# Patient Record
Sex: MALE | Race: ASIAN | NOT HISPANIC OR LATINO | Employment: UNEMPLOYED | URBAN - METROPOLITAN AREA
[De-identification: names, ages, dates, MRNs, and addresses within clinical notes are randomized per-mention and may not be internally consistent; named-entity substitution may affect disease eponyms.]

---

## 2023-04-26 ENCOUNTER — OFFICE VISIT (OUTPATIENT)
Dept: GASTROENTEROLOGY | Facility: CLINIC | Age: 33
End: 2023-04-26

## 2023-04-26 VITALS
SYSTOLIC BLOOD PRESSURE: 155 MMHG | BODY MASS INDEX: 30.32 KG/M2 | HEIGHT: 70 IN | OXYGEN SATURATION: 96 % | HEART RATE: 75 BPM | WEIGHT: 211.81 LBS | DIASTOLIC BLOOD PRESSURE: 95 MMHG

## 2023-04-26 DIAGNOSIS — R10.13 EPIGASTRIC PAIN: Primary | ICD-10-CM

## 2023-04-26 DIAGNOSIS — K21.9 GASTROESOPHAGEAL REFLUX DISEASE, UNSPECIFIED WHETHER ESOPHAGITIS PRESENT: ICD-10-CM

## 2023-04-26 PROCEDURE — 99204 OFFICE O/P NEW MOD 45 MIN: CPT | Mod: S$GLB,,, | Performed by: INTERNAL MEDICINE

## 2023-04-26 PROCEDURE — 99204 PR OFFICE/OUTPT VISIT, NEW, LEVL IV, 45-59 MIN: ICD-10-PCS | Mod: S$GLB,,, | Performed by: INTERNAL MEDICINE

## 2023-04-26 RX ORDER — PANTOPRAZOLE SODIUM 40 MG/1
40 TABLET, DELAYED RELEASE ORAL DAILY
Qty: 90 TABLET | Refills: 1 | Status: SHIPPED | OUTPATIENT
Start: 2023-04-26 | End: 2023-07-26 | Stop reason: SDUPTHER

## 2023-04-26 RX ORDER — CETIRIZINE HYDROCHLORIDE 10 MG/1
10 TABLET, CHEWABLE ORAL
COMMUNITY
End: 2023-12-07

## 2023-04-26 RX ORDER — OMEPRAZOLE 10 MG/1
10 CAPSULE, DELAYED RELEASE ORAL DAILY
COMMUNITY
End: 2023-04-26

## 2023-04-26 RX ORDER — SUCRALFATE 1 G/1
1 TABLET ORAL
Qty: 40 TABLET | Refills: 0 | Status: SHIPPED | OUTPATIENT
Start: 2023-04-26 | End: 2023-05-06

## 2023-04-26 NOTE — PROGRESS NOTES
"[facility]  [address] [place] [address]  Phone: [phone]  Fax: [phone]    History & Physical         Provider: Dr. Melania Leon    Patient Name: [facility] SHAW (age):1990  32 y.o.           Gender: male   Phone: [phone]     Referring Physician: Symone Barraza     Vital Signs:   Height - 5' 10"  Weight - 211 lb  BMI -  30.39    Plan: EGD    Encounter Diagnoses   Name Primary?    Epigastric pain Yes    Gastroesophageal reflux disease, unspecified whether esophagitis present            History:      Past Medical History:   Diagnosis Date    Other seasonal allergic rhinitis       Past Surgical History:   Procedure Laterality Date    CYST REMOVAL      vocal cord    WISDOM TOOTH EXTRACTION        Medication List with Changes/Refills   Current Medications    CETIRIZINE 10 MG CHEWABLE TABLET    Take 10 mg by mouth.    PANTOPRAZOLE (PROTONIX) 40 MG TABLET    Take 1 tablet (40 mg total) by mouth once daily.    SUCRALFATE (CARAFATE) 1 GRAM TABLET    Take 1 tablet (1 g total) by mouth 4 (four) times daily before meals and nightly. for 10 days      Review of patient's allergies indicates:  No Known Allergies   No family history on file.   Social History     Tobacco Use    Smoking status: Never    Smokeless tobacco: Never   Substance Use Topics    Alcohol use: Not Currently    Drug use: Never        Physical Examination:     General Appearance:___________________________  HEENT: " _____________________________________  Abdomen:____________________________________  Heart:________________________________________  Lungs:_______________________________________  Extremities:___________________________________  Skin:_________________________________________  Endocrine:____________________________________  Genitourinary:_________________________________  Neurological:__________________________________      Patient has been evaluated immediately prior to sedation and is medically cleared for endoscopy with IVCS as an ASA class: ______      Physician Signature: _________________________       Date: ________  Time: ________

## 2023-04-26 NOTE — LETTER
April 26, 2023        Symone Barraza MD  [address] [facility] [place] [address]             [facility]  [address] [place] [address]  Phone: [phone]  Fax: [phone]   Patient: [place] Paris   MR Number: [id number]   YOB: 1990   Date of Visit: 4/26/2023       Dear Dr. Barraza:    Thank you for referring Nolan Paris to me for evaluation. Attached you will find relevant portions of my assessment and plan of care.    If you have questions, please do not hesitate to call me. I look forward to following Nolan Paris along with you.    Sincerely,      Melania Leon MD            CC  No Recipients    Enclosure

## 2023-04-26 NOTE — PROGRESS NOTES
Clinic Note    Reason for visit:  The primary encounter diagnosis was Epigastric pain. A diagnosis of Gastroesophageal reflux disease, unspecified whether esophagitis present was also pertinent to this visit.    PCP: Symone Barraza       HPI:  This is a 32 y.o. male who is here to establish care. Patient reports having epigastric pain that started about 3 months ago. Pain gets better when he eats. Tums helps, Peptobismol, and milk help. He has also been taking Prilosec OTC daily since this started and thinks it it helping. Pain comes on when he lays down. He also has been belching a lot. Denies blood in stool or black stool. Regular BMs. No diarrhea. Denies dysphagia. May take ibuprofen once a month for headache. He also states when this abdominal pain started, he actually had pain between his shoulder blades that happened before. He still has that pain intermittently as well. Usually comes right before epigastric pain. He states he went to urgent care when pain started and had normal UA and believes negative lipase. Also had negative H. Pylori breath test.        Review of Systems   Constitutional:  Negative for chills, diaphoresis, fatigue, fever and unexpected weight change.   HENT:  Negative for hearing loss, mouth sores, nosebleeds, postnasal drip, sore throat, trouble swallowing and voice change.    Eyes:  Negative for pain, discharge and eye dryness.   Respiratory:  Negative for apnea, cough, choking, chest tightness, shortness of breath and wheezing.    Cardiovascular:  Negative for chest pain, palpitations, leg swelling and claudication.   Gastrointestinal:  Positive for reflux. Negative for abdominal distention, abdominal pain, anal bleeding, blood in stool, change in bowel habit, constipation, diarrhea, nausea, rectal pain, vomiting, fecal incontinence and change in bowel habit.   Genitourinary:  Negative for bladder incontinence, difficulty urinating, dysuria, flank pain, frequency and hematuria.  "  Musculoskeletal:  Positive for back pain. Negative for arthralgias, joint swelling and joint deformity.   Integumentary:  Negative for color change, rash and wound.   Allergic/Immunologic: Negative for environmental allergies and food allergies.   Neurological:  Negative for seizures, facial asymmetry, speech difficulty, weakness, headaches and memory loss.   Hematological:  Negative for adenopathy. Does not bruise/bleed easily.   Psychiatric/Behavioral:  Negative for agitation, behavioral problems, confusion, hallucinations and sleep disturbance.       Past Medical History:   Diagnosis Date    Other seasonal allergic rhinitis      Past Surgical History:   Procedure Laterality Date    CYST REMOVAL      vocal cord    WISDOM TOOTH EXTRACTION       History reviewed. No pertinent family history.  Social History     Tobacco Use    Smoking status: Never    Smokeless tobacco: Never   Substance Use Topics    Alcohol use: Not Currently    Drug use: Never     Review of patient's allergies indicates:  No Known Allergies     Medication List with Changes/Refills   New Medications    PANTOPRAZOLE (PROTONIX) 40 MG TABLET    Take 1 tablet (40 mg total) by mouth once daily.    SUCRALFATE (CARAFATE) 1 GRAM TABLET    Take 1 tablet (1 g total) by mouth 4 (four) times daily before meals and nightly. for 10 days   Current Medications    CETIRIZINE 10 MG CHEWABLE TABLET    Take 10 mg by mouth.   Discontinued Medications    OMEPRAZOLE (PRILOSEC) 10 MG CAPSULE    Take 10 mg by mouth once daily.        Vital Signs:  BP (!) 155/95   Pulse 75   Ht 5' 10" (1.778 m)   Wt 96.1 kg (211 lb 12.8 oz)   SpO2 96%   BMI 30.39 kg/m²         Physical Exam  Constitutional:       General: He is not in acute distress.     Appearance: Normal appearance. He is well-developed. He is not ill-appearing or toxic-appearing.   HENT:      Head: Normocephalic and atraumatic.      Nose: Nose normal.      Mouth/Throat:      Mouth: Mucous membranes are moist.      " Pharynx: Oropharynx is clear. No oropharyngeal exudate or posterior oropharyngeal erythema.   Eyes:      General: Lids are normal. No scleral icterus.        Right eye: No discharge.         Left eye: No discharge.      Extraocular Movements: Extraocular movements intact.      Conjunctiva/sclera: Conjunctivae normal.   Cardiovascular:      Rate and Rhythm: Normal rate and regular rhythm.      Pulses:           Radial pulses are 2+ on the right side and 2+ on the left side.   Pulmonary:      Effort: Pulmonary effort is normal. No respiratory distress.      Breath sounds: No stridor. No wheezing or rhonchi.   Abdominal:      General: Bowel sounds are normal. There is no distension.      Palpations: Abdomen is soft. There is no fluid wave, hepatomegaly, splenomegaly or mass.      Tenderness: There is abdominal tenderness in the epigastric area. There is no guarding or rebound.      Comments: Mild    Musculoskeletal:      Cervical back: Full passive range of motion without pain.      Right lower leg: No edema.      Left lower leg: No edema.   Lymphadenopathy:      Cervical: No cervical adenopathy.   Skin:     General: Skin is warm and dry.      Capillary Refill: Capillary refill takes less than 2 seconds.      Coloration: Skin is not cyanotic, jaundiced or pale.      Findings: No rash.   Neurological:      General: No focal deficit present.      Mental Status: He is alert and oriented to person, place, and time.   Psychiatric:         Mood and Affect: Mood normal.         Behavior: Behavior is cooperative.          All of the data above and below has been reviewed by myself and any further interpretations will be reflected in the assessment and plan.   The data includes review of external notes, and independent interpretation of lab results, procedures, x-rays, and imaging reports.      Assessment:  Epigastric pain  -     sucralfate (CARAFATE) 1 gram tablet; Take 1 tablet (1 g total) by mouth 4 (four) times daily before  meals and nightly. for 10 days  Dispense: 40 tablet; Refill: 0  -     Ambulatory Referral to External Surgery    Gastroesophageal reflux disease, unspecified whether esophagitis present  -     pantoprazole (PROTONIX) 40 MG tablet; Take 1 tablet (40 mg total) by mouth once daily.  Dispense: 90 tablet; Refill: 1  -     Ambulatory Referral to External Surgery      Ulcer v GERD v H. Pylori v GB  Plan for gastric biopsies.   Offered abd US now. He would like to wait until after EGD. If EGD neg, then will proceed with ABD US and possibly HIDA scan if neg.     Recommendations:  Schedule upper endoscopy.   Begin taking pantoprazole 40 mg daily. Stop omeprazole.   May take Carafate with each meal and at bedtime. Stop taking if it does not help.     Risks, benefits, and alternatives of medical management, any associated procedures, and/or treatment discussed with the patient. Patient given opportunity to ask questions and voices understanding. Patient has elected to proceed with the recommended care modalities as discussed.    Follow up in about 6 months (around 10/26/2023).    Order summary:  Orders Placed This Encounter   Procedures    Ambulatory Referral to External Surgery        Instructed patient to notify my office if they have not been contacted within two weeks after any procedures, submitting any samples (biopsies, blood, stool, urine, etc.) or after any imaging (X-ray, CT, MRI, etc.).     Melania Leon MD    This document may have been created using a voice recognition transcribing system. Incorrect words or phrases may have been missed during proofreading. Please interpret accordingly or contact me for clarification.

## 2023-04-26 NOTE — PATIENT INSTRUCTIONS
Schedule upper endoscopy.   Begin taking pantoprazole 40 mg daily. Stop omeprazole.   May take Carafate with each meal and at bedtime. Stop taking if it does not help.

## 2023-05-09 ENCOUNTER — TELEPHONE (OUTPATIENT)
Dept: ADMINISTRATIVE | Facility: CLINIC | Age: 33
End: 2023-05-09

## 2023-05-10 ENCOUNTER — TELEPHONE (OUTPATIENT)
Dept: ADMINISTRATIVE | Facility: CLINIC | Age: 33
End: 2023-05-10

## 2023-05-11 ENCOUNTER — OUTSIDE PLACE OF SERVICE (OUTPATIENT)
Dept: GASTROENTEROLOGY | Facility: CLINIC | Age: 33
End: 2023-05-11

## 2023-05-11 PROCEDURE — 43239 EGD BIOPSY SINGLE/MULTIPLE: CPT | Mod: ,,, | Performed by: INTERNAL MEDICINE

## 2023-05-11 PROCEDURE — 43239 PR EGD, FLEX, W/BIOPSY, SGL/MULTI: ICD-10-PCS | Mod: ,,, | Performed by: INTERNAL MEDICINE

## 2023-05-18 ENCOUNTER — TELEPHONE (OUTPATIENT)
Dept: GASTROENTEROLOGY | Facility: CLINIC | Age: 33
End: 2023-05-18

## 2023-05-18 NOTE — TELEPHONE ENCOUNTER
EGBx reflux.    Notify patient that his esophagus Bx looked well.  He had some minor changes of reflux. He reported about 75% improvement with panto 40 daily. Okay to take sucralfate as needed.  After two months he may try stopping the panto. Notify me if any issues before his next OV. If Sx worsen/recur on panto, then may need to order abd US.  NBP

## 2023-07-18 ENCOUNTER — TELEPHONE (OUTPATIENT)
Dept: GASTROENTEROLOGY | Facility: CLINIC | Age: 33
End: 2023-07-18

## 2023-07-18 DIAGNOSIS — R19.7 DIARRHEA, UNSPECIFIED TYPE: Primary | ICD-10-CM

## 2023-07-18 NOTE — TELEPHONE ENCOUNTER
----- Message from Dov Moreno sent at 7/18/2023  2:18 PM CDT -----  Contact: Pt  Type:  Sooner Apoointment Request    Caller is requesting a sooner appointment.  Caller declined first available appointment listed below.  Caller will not accept being placed on the waitlist and is requesting a message be sent to doctor.  Name of Caller: pt  When is the first available appointment?   Symptoms: freq diarrhea x's 2 weeks  Would the patient rather a call back or a response via [facility]? Both portal / phone  Best Call Back Number: [phone]  Additional Information: pt wants to be seen before the week ends

## 2023-07-18 NOTE — TELEPHONE ENCOUNTER
Diarrhea for more than 2 weeks. Patient describes stools as yellow and green, fluffy and loose with occasional pain. Sometimes stool alternates between sausage shaped and watery diarrhea. After he eats he noticed what he describes as either a hunger pain and/or nausea. He wasn't sure if any of this would be related to GERD? -KG

## 2023-07-21 NOTE — TELEPHONE ENCOUNTER
These symptoms are not related to GERD. Since it has been going on for 2 weeks I can order stool test to rule out infection. Please let pt. Know that stool can not be formed when he does these tests. Orders signed.  KN

## 2023-07-24 ENCOUNTER — PATIENT MESSAGE (OUTPATIENT)
Dept: GASTROENTEROLOGY | Facility: CLINIC | Age: 33
End: 2023-07-24

## 2023-07-25 NOTE — PROGRESS NOTES
Clinic Note    Reason for visit:  The primary encounter diagnosis was Diarrhea, unspecified type. Diagnoses of Gastroesophageal reflux disease, unspecified whether esophagitis present and Irritable bowel syndrome with diarrhea were also pertinent to this visit.    PCP: Symone Barraza       HPI:  This is a 32 y.o. male who is established. Pt with with GERD- on pantoprazole 40mg daily. Here for diarrhea- stool tests were ordered.    Feels like knots running up and down his stomach and eating helps it. Infrequent. About twice a month. May be triggered by nerves. Taking panto 40 daily. If misses a day, then unclear if Sx are worse.  About 3-4 weeks ago went on a trip to [place]. Stools were loose x2 weeks.  He may have had about 4 nocturnal bowel movements in the middle of the night.  No blood in the stool.  Primarily loose.  This stools maybe looser as a progress.  May be small amounts.  Fluctuates somewhat.  No recent antibiotic use.  No sick contacts.  No fever.  He will get some lower mid abdominal discomfort either during the bowel movement maybe just before but that seems to improve after the bowel movement.  Seem to be fluctuating. Has not turned in the stool samples yet.  Has tried Tums, antidiarrheal, Pepto. The last two helped.    EGD 5/12/2023 EGBx reflux.    Review of Systems   Constitutional:  Negative for diaphoresis, fatigue, fever and unexpected weight change.   HENT:  Negative for hearing loss, mouth sores, postnasal drip, sore throat and trouble swallowing.    Eyes:  Negative for pain, discharge and eye dryness.   Respiratory:  Negative for apnea, cough, choking, chest tightness, shortness of breath and wheezing.    Cardiovascular:  Negative for chest pain, palpitations and leg swelling.   Gastrointestinal:  Positive for abdominal pain, change in bowel habit, constipation, diarrhea and change in bowel habit. Negative for abdominal distention, anal bleeding, blood in stool, nausea, rectal pain,  vomiting, reflux and fecal incontinence.   Genitourinary:  Negative for bladder incontinence, dysuria and hematuria.   Musculoskeletal:  Negative for arthralgias, back pain and joint swelling.   Integumentary:  Negative for color change and rash.   Allergic/Immunologic: Negative for environmental allergies and food allergies.   Neurological:  Negative for seizures and headaches.   Hematological:  Negative for adenopathy. Does not bruise/bleed easily.      Past Medical History:   Diagnosis Date    GERD (gastroesophageal reflux disease)     Other seasonal allergic rhinitis      Past Surgical History:   Procedure Laterality Date    CYST REMOVAL      vocal cord    ENDOSCOPY      WISDOM TOOTH EXTRACTION       Family History   Problem Relation Age of Onset    No Known Problems Mother     No Known Problems Father     No Known Problems Sister     No Known Problems Brother     No Known Problems Maternal Aunt     No Known Problems Maternal Uncle     No Known Problems Paternal Aunt     No Known Problems Paternal Uncle     No Known Problems Maternal Grandmother     No Known Problems Maternal Grandfather     No Known Problems Paternal Grandmother     No Known Problems Paternal Grandfather      Social History     Tobacco Use    Smoking status: Never    Smokeless tobacco: Never   Substance Use Topics    Alcohol use: Not Currently    Drug use: Never     Review of patient's allergies indicates:  No Known Allergies     Medication List with Changes/Refills   New Medications    DICYCLOMINE (BENTYL) 10 MG CAPSULE    Take 1 capsule (10 mg total) by mouth before meals and at bedtime as needed (loose stools or abdominal cramping).   Current Medications    CETIRIZINE 10 MG CHEWABLE TABLET    Take 10 mg by mouth.   Changed and/or Refilled Medications    Modified Medication Previous Medication    PANTOPRAZOLE (PROTONIX) 20 MG TABLET pantoprazole (PROTONIX) 40 MG tablet       Take 1 tablet (20 mg total) by mouth once daily.    Take 1 tablet  "(40 mg total) by mouth once daily.         Vital Signs:  BP (!) 142/91   Pulse 75   Ht 5' 10" (1.778 m)   Wt 96.6 kg (213 lb)   SpO2 97%   BMI 30.56 kg/m²         Physical Exam  Constitutional:       General: He is not in acute distress.     Appearance: Normal appearance. He is well-developed. He is not ill-appearing or toxic-appearing.   HENT:      Head: Normocephalic and atraumatic.      Nose: Nose normal.      Mouth/Throat:      Mouth: Mucous membranes are moist.      Pharynx: Oropharynx is clear. No oropharyngeal exudate or posterior oropharyngeal erythema.   Eyes:      General: Lids are normal. No scleral icterus.        Right eye: No discharge.         Left eye: No discharge.      Conjunctiva/sclera: Conjunctivae normal.   Cardiovascular:      Rate and Rhythm: Normal rate and regular rhythm.      Pulses:           Radial pulses are 2+ on the right side and 2+ on the left side.   Pulmonary:      Effort: Pulmonary effort is normal. No respiratory distress.      Breath sounds: No stridor. No wheezing.   Abdominal:      General: Bowel sounds are normal. There is no distension.      Palpations: Abdomen is soft. There is no fluid wave, hepatomegaly, splenomegaly or mass.      Tenderness: There is no abdominal tenderness. There is no guarding or rebound.   Musculoskeletal:      Cervical back: Full passive range of motion without pain.   Lymphadenopathy:      Cervical: No cervical adenopathy.   Skin:     General: Skin is warm and dry.      Capillary Refill: Capillary refill takes less than 2 seconds.      Coloration: Skin is not cyanotic, jaundiced or pale.   Neurological:      General: No focal deficit present.      Mental Status: He is alert and oriented to person, place, and time.   Psychiatric:         Mood and Affect: Mood normal.         Behavior: Behavior is cooperative.          All of the data above and below has been reviewed by myself and any further interpretations will be reflected in the assessment " and plan.   The data includes review of external notes, and independent interpretation of lab results, procedures, x-rays, and imaging reports.      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  Dispense: 40 capsule; Refill: 1    Gastroesophageal reflux disease, unspecified whether esophagitis present  Comments:  improved with panto 40 daily, trial of 20 daily  Orders:  -     pantoprazole (PROTONIX) 20 MG tablet; Take 1 tablet (20 mg total) by mouth once daily.  Dispense: 90 tablet; Refill: 2    Irritable bowel syndrome with diarrhea  -     dicyclomine (BENTYL) 10 MG capsule; Take 1 capsule (10 mg total) by mouth before meals and at bedtime as needed (loose stools or abdominal cramping).  Dispense: 40 capsule; Refill: 1    Overall his symptoms do not seem alarming.  It seems as if he may have had an infectious or potentially ingestion related event and may be a postinfectious IBS on top of that.  His upper GI symptoms overall have improved will work on tapering his PPI.  Plan as below.     Recommendations:  Submit stool samples.    Okay to take 1 chewable Pepto-Bismol up to 2 or 3 times a day.  May take dicyclomine as needed.  If you begin to see blood in your stool, have more frequent nighttime bowel movements that wake him up from sleep, increasing abdominal pain, weight loss, fever, or any other concerning symptoms then notify me.  You may continue the pantoprazole daily.  I have submitted the 20 mg lower dose tablets.    Please notify my office if you have not been contacted within two weeks after any procedures, submitting any samples (biopsies, blood, stool, urine, etc.) or after any imaging (X-ray, CT, MRI, etc.).     Please notify my office if you have not been contacted within  two weeks after any procedures, submitting any samples (biopsies, blood, stool, urine, etc.) or after any imaging (X-ray, CT, MRI, etc.).     Please notify my office if you have not been contacted within two weeks after any procedures, submitting any samples (biopsies, blood, stool, urine, etc.) or after any imaging (X-ray, CT, MRI, etc.).     Risks, benefits, and alternatives of medical management, any associated procedures, and/or treatment discussed with the patient. Patient given opportunity to ask questions and voices understanding. Patient has elected to proceed with the recommended care modalities as discussed.    Instructed patient to notify my office if they have not been contacted within two weeks after any procedures, submitting any samples (biopsies, blood, stool, urine, etc.) or after any imaging (X-ray, CT, MRI, etc.).     Follow up if symptoms worsen or fail to improve.  Has 12/2023 OV in place.    Order summary:  No orders of the defined types were placed in this encounter.         This document may have been created using a voice recognition transcribing system. Incorrect words or phrases may have been missed during proofreading. Please interpret accordingly or contact me for clarification.     Melania Leon MD

## 2023-07-26 ENCOUNTER — OFFICE VISIT (OUTPATIENT)
Dept: GASTROENTEROLOGY | Facility: CLINIC | Age: 33
End: 2023-07-26

## 2023-07-26 VITALS
WEIGHT: 213 LBS | SYSTOLIC BLOOD PRESSURE: 142 MMHG | BODY MASS INDEX: 30.49 KG/M2 | HEIGHT: 70 IN | HEART RATE: 75 BPM | OXYGEN SATURATION: 97 % | DIASTOLIC BLOOD PRESSURE: 91 MMHG

## 2023-07-26 DIAGNOSIS — K21.9 GASTROESOPHAGEAL REFLUX DISEASE, UNSPECIFIED WHETHER ESOPHAGITIS PRESENT: ICD-10-CM

## 2023-07-26 DIAGNOSIS — K58.0 IRRITABLE BOWEL SYNDROME WITH DIARRHEA: ICD-10-CM

## 2023-07-26 DIAGNOSIS — R19.7 DIARRHEA, UNSPECIFIED TYPE: Primary | ICD-10-CM

## 2023-07-26 LAB
APPEARANCE SNV: NORMAL
APPEARANCE SNV: NORMAL
C DIFF TOXIN: NEGATIVE
COLOR UR: NORMAL
CRYPTOSPORIDIUM DNA: NOT DETECTED
ENTAMOEBA HISTOLYTICA: NOT DETECTED
GIARDIA LAMBLIA DNA: NOT DETECTED
SOURCE: NORMAL

## 2023-07-26 PROCEDURE — 99214 OFFICE O/P EST MOD 30 MIN: CPT | Mod: S$GLB,,, | Performed by: INTERNAL MEDICINE

## 2023-07-26 PROCEDURE — 99214 PR OFFICE/OUTPT VISIT, EST, LEVL IV, 30-39 MIN: ICD-10-PCS | Mod: S$GLB,,, | Performed by: INTERNAL MEDICINE

## 2023-07-26 RX ORDER — DICYCLOMINE HYDROCHLORIDE 10 MG/1
10 CAPSULE ORAL
Qty: 40 CAPSULE | Refills: 1 | Status: SHIPPED | OUTPATIENT
Start: 2023-07-26

## 2023-07-26 RX ORDER — PANTOPRAZOLE SODIUM 20 MG/1
20 TABLET, DELAYED RELEASE ORAL DAILY
Qty: 90 TABLET | Refills: 2 | Status: SHIPPED | OUTPATIENT
Start: 2023-07-26

## 2023-07-26 NOTE — LETTER
July 26, 2023        Symone Barraza MD  [address] [facility] [place] [address]             [facility]  [address]  [place] [address]  Phone: [phone]  Fax: [phone]   Patient: [facility]   MR Number: [id number]   YOB: 1990   Date of Visit: 7/26/2023       Dear Dr. Barraza:    Thank you for referring Demetrius Paris to me for evaluation. Attached you will find relevant portions of my assessment and plan of care.    If you have questions, please do not hesitate to call me. I look forward to following Demetrius Paris along with you.    Sincerely,      Melania Leon MD            CC  No Recipients    Enclosure

## 2023-07-26 NOTE — PATIENT INSTRUCTIONS
Submit stool samples.    Okay to take 1 chewable Pepto-Bismol up to 2 or 3 times a day.  May take dicyclomine as needed.  If you begin to see blood in your stool, have more frequent nighttime bowel movements that wake him up from sleep, increasing abdominal pain, weight loss, fever, or any other concerning symptoms then notify me.  You may continue the pantoprazole daily.  I have submitted the 20 mg lower dose tablets.    Please notify my office if you have not been contacted within two weeks after any procedures, submitting any samples (biopsies, blood, stool, urine, etc.) or after any imaging (X-ray, CT, MRI, etc.).

## 2023-07-26 NOTE — PROGRESS NOTES
7/26/2023 C.diff/ Giar/Cryp- negative.     Please notify patient that his stool test came back and it was negative for infection.  KN

## 2023-07-27 ENCOUNTER — TELEPHONE (OUTPATIENT)
Dept: GASTROENTEROLOGY | Facility: CLINIC | Age: 33
End: 2023-07-27

## 2023-07-27 NOTE — TELEPHONE ENCOUNTER
----- Message from TIA Lowe sent at 7/26/2023  4:36 PM CDT -----  7/26/2023 C.diff/ Giar/Cryp- negative.     Please notify patient that his stool test came back and it was negative for infection.  KN

## 2023-12-07 ENCOUNTER — OFFICE VISIT (OUTPATIENT)
Dept: GASTROENTEROLOGY | Facility: CLINIC | Age: 33
End: 2023-12-07

## 2023-12-07 VITALS
OXYGEN SATURATION: 98 % | WEIGHT: 218.81 LBS | HEIGHT: 70 IN | HEART RATE: 94 BPM | SYSTOLIC BLOOD PRESSURE: 126 MMHG | BODY MASS INDEX: 31.32 KG/M2 | DIASTOLIC BLOOD PRESSURE: 88 MMHG

## 2023-12-07 DIAGNOSIS — K58.0 IRRITABLE BOWEL SYNDROME WITH DIARRHEA: Primary | ICD-10-CM

## 2023-12-07 DIAGNOSIS — R19.7 DIARRHEA, UNSPECIFIED TYPE: ICD-10-CM

## 2023-12-07 PROCEDURE — 99212 PR OFFICE/OUTPT VISIT, EST, LEVL II, 10-19 MIN: ICD-10-PCS | Mod: S$GLB,,, | Performed by: INTERNAL MEDICINE

## 2023-12-07 PROCEDURE — 99212 OFFICE O/P EST SF 10 MIN: CPT | Mod: S$GLB,,, | Performed by: INTERNAL MEDICINE

## 2023-12-07 NOTE — PROGRESS NOTES
Clinic Note    Reason for visit:  The primary encounter diagnosis was Irritable bowel syndrome with diarrhea. A diagnosis of Diarrhea, unspecified type was also pertinent to this visit.    PCP: Symone Barraza       HPI:  This is a 33 y.o. male who is here for a follow up. Patient with GERD. He was on pantoprazole 40 mg daily and this was reduced to 20 mg daily last OV. Patient has been out of pantoprazole for past month and has been feeling well. He states his diarrhea has improved and only having symptoms about 2 out of 7 days of the week. Each time bowel movements are very loose. Symptoms are much better than they were in September. No abdominal pain. Does not take dicyclomine.    7/26/2023 C.diff/ Giar/Cryp- negative.     EGD 5/11/2023: Small HH, EGBx reflux.     Review of Systems   Constitutional:  Negative for diaphoresis, fatigue, fever and unexpected weight change.   HENT:  Negative for hearing loss, mouth sores, postnasal drip, sore throat and trouble swallowing.    Eyes:  Negative for pain, discharge and eye dryness.   Respiratory:  Negative for apnea, cough, choking, chest tightness, shortness of breath and wheezing.    Cardiovascular:  Negative for chest pain, palpitations and leg swelling.   Gastrointestinal:  Negative for abdominal distention, abdominal pain, anal bleeding, blood in stool, change in bowel habit, constipation, diarrhea, nausea, rectal pain, vomiting, reflux and fecal incontinence.   Genitourinary:  Negative for bladder incontinence, dysuria and hematuria.   Musculoskeletal:  Negative for arthralgias, back pain and joint swelling.   Integumentary:  Negative for color change and rash.   Allergic/Immunologic: Negative for environmental allergies and food allergies.   Neurological:  Negative for seizures and headaches.   Hematological:  Negative for adenopathy. Does not bruise/bleed easily.        Past Medical History:   Diagnosis Date    GERD (gastroesophageal reflux disease)      "Other seasonal allergic rhinitis      Past Surgical History:   Procedure Laterality Date    CYST REMOVAL      vocal cord    ENDOSCOPY      WISDOM TOOTH EXTRACTION       Family History   Problem Relation Age of Onset    No Known Problems Mother     No Known Problems Father     No Known Problems Sister     No Known Problems Brother     No Known Problems Maternal Aunt     No Known Problems Maternal Uncle     No Known Problems Paternal Aunt     No Known Problems Paternal Uncle     No Known Problems Maternal Grandmother     No Known Problems Maternal Grandfather     No Known Problems Paternal Grandmother     No Known Problems Paternal Grandfather     Colon cancer Neg Hx     Crohn's disease Neg Hx     Esophageal cancer Neg Hx     Hemochromatosis Neg Hx     Liver cancer Neg Hx     Liver disease Neg Hx     Rectal cancer Neg Hx     Stomach cancer Neg Hx      Social History     Tobacco Use    Smoking status: Never    Smokeless tobacco: Never   Substance Use Topics    Alcohol use: Never    Drug use: Never     Review of patient's allergies indicates:  No Known Allergies     Medication List with Changes/Refills   Current Medications    DICYCLOMINE (BENTYL) 10 MG CAPSULE    Take 1 capsule (10 mg total) by mouth before meals and at bedtime as needed (loose stools or abdominal cramping).    PANTOPRAZOLE (PROTONIX) 20 MG TABLET    Take 1 tablet (20 mg total) by mouth once daily.   Discontinued Medications    CETIRIZINE 10 MG CHEWABLE TABLET    Take 10 mg by mouth.         Vital Signs:  /88 (BP Location: Left arm, Patient Position: Sitting)   Pulse 94   Ht 5' 10" (1.778 m)   Wt 99.2 kg (218 lb 12.8 oz)   SpO2 98%   BMI 31.39 kg/m²         Physical Exam  Constitutional:       General: He is not in acute distress.     Appearance: Normal appearance. He is well-developed. He is not ill-appearing or toxic-appearing.   HENT:      Head: Normocephalic and atraumatic.      Nose: Nose normal.      Mouth/Throat:      Mouth: Mucous " membranes are moist.      Pharynx: Oropharynx is clear. No oropharyngeal exudate or posterior oropharyngeal erythema.   Eyes:      General: Lids are normal. No scleral icterus.        Right eye: No discharge.         Left eye: No discharge.      Conjunctiva/sclera: Conjunctivae normal.   Cardiovascular:      Rate and Rhythm: Normal rate and regular rhythm.      Pulses:           Radial pulses are 2+ on the right side and 2+ on the left side.   Pulmonary:      Effort: Pulmonary effort is normal. No respiratory distress.      Breath sounds: No stridor. No wheezing.   Abdominal:      General: Bowel sounds are normal. There is no distension.      Palpations: Abdomen is soft. There is no fluid wave, hepatomegaly, splenomegaly or mass.      Tenderness: There is no abdominal tenderness. There is no guarding or rebound.   Musculoskeletal:      Cervical back: Full passive range of motion without pain.   Lymphadenopathy:      Cervical: No cervical adenopathy.   Skin:     General: Skin is warm and dry.      Capillary Refill: Capillary refill takes less than 2 seconds.      Coloration: Skin is not cyanotic, jaundiced or pale.   Neurological:      General: No focal deficit present.      Mental Status: He is alert and oriented to person, place, and time.   Psychiatric:         Mood and Affect: Mood normal.         Behavior: Behavior is cooperative.            All of the data above and below has been reviewed by myself and any further interpretations will be reflected in the assessment and plan.   The data includes review of external notes, and independent interpretation of lab results, procedures, x-rays, and imaging reports.      Assessment:  Irritable bowel syndrome with diarrhea    Diarrhea, unspecified type         IBS-D. Patient diarrhea improving. May consider Xifaxan.    Recommendations:    Okay to take Pantoprazole as needed.      Risks, benefits, and alternatives of medical management, any associated procedures, and/or  treatment discussed with the patient. Patient given opportunity to ask questions and voices understanding. Patient has elected to proceed with the recommended care modalities as discussed.    Instructed patient to notify my office if they have not been contacted within two weeks after any procedures, submitting any samples (biopsies, blood, stool, urine, etc.) or after any imaging (X-ray, CT, MRI, etc.).     Follow up in about 1 year (around 12/7/2024).    Order summary:  No orders of the defined types were placed in this encounter.     This assessment, plan, and documentation was performed in collaboration with Judy Meza NP.     This document may have been created using a voice recognition transcribing system. Incorrect words or phrases may have been missed during proofreading. Please interpret accordingly or contact me for clarification.     Melania Leon MD

## 2023-12-07 NOTE — LETTER
December 7, 2023        Symone Barraza MD  [address] [place] [address]             [facility]  [address] [address]  Phone: [phone]  Fax: [phone]   Patient: Nolan Paris   MR Number: [id number]   YOB: 1990   Date of Visit: 12/7/2023       Dear Dr. Barraza:    Thank you for referring Nolan Paris to me for evaluation. Attached you will find relevant portions of my assessment and plan of care.    If you have questions, please do not hesitate to call me. I look forward to following Nolan Paris along with you.    Sincerely,      Melania Leon MD            CC  No Recipients    Enclosure

## 2024-01-17 DIAGNOSIS — K21.9 GASTROESOPHAGEAL REFLUX DISEASE, UNSPECIFIED WHETHER ESOPHAGITIS PRESENT: ICD-10-CM

## 2024-01-19 RX ORDER — SUCRALFATE 1 G/1
1 TABLET ORAL 4 TIMES DAILY
Qty: 40 TABLET | Refills: 0 | OUTPATIENT
Start: 2024-01-19

## 2024-07-02 DIAGNOSIS — K21.9 GASTROESOPHAGEAL REFLUX DISEASE, UNSPECIFIED WHETHER ESOPHAGITIS PRESENT: ICD-10-CM

## 2024-07-05 RX ORDER — PANTOPRAZOLE SODIUM 20 MG/1
20 TABLET, DELAYED RELEASE ORAL
Qty: 90 TABLET | Refills: 2 | Status: SHIPPED | OUTPATIENT
Start: 2024-07-05

## 2024-09-04 ENCOUNTER — TELEPHONE (OUTPATIENT)
Dept: GASTROENTEROLOGY | Facility: CLINIC | Age: 34
End: 2024-09-04

## 2024-09-04 DIAGNOSIS — K21.9 GASTROESOPHAGEAL REFLUX DISEASE, UNSPECIFIED WHETHER ESOPHAGITIS PRESENT: ICD-10-CM

## 2024-09-04 NOTE — TELEPHONE ENCOUNTER
----- Message from Rosa Mahan sent at 9/4/2024  2:59 PM CDT -----  Contact: self  Patient is requesting a call back regarding GERD flaring up - asking can he up his dosage of medication. Please call back at [phone]

## 2024-09-04 NOTE — TELEPHONE ENCOUNTER
Patient states recently past 2 months, has had more flare up with GERD. Patient currently taking 20mg pantoprazole. Patient states in past he was on 40mg, but dosage was decreased. Patient wanting to know if he can increase back up to 40mg at this time in order to control GERD flare up he is currently having. Patient wanting to know if there are any side effects or risks with increasing from 20mg to 40mg. Please advise. Patient uses [facility] pharmacy inside [employer]. Patient states to leave a message or can even send portal message for feedback if he is unable to be reached. BF

## 2024-09-05 NOTE — TELEPHONE ENCOUNTER
Staff spoke to pt.. he advised he's been having a flare up and is wanting to know if he can increase the  pantoprazole to 40mg until the reflux is under control?  viry

## 2024-09-05 NOTE — TELEPHONE ENCOUNTER
----- Message from Selene Ojeda sent at 9/5/2024  4:38 PM CDT -----  Regarding: Prescription  Contact: patient  Per phone call with patient, he stated that he had started to take this medication for GERD PANTOPRAZOLE 20 MG and he wanted to know if he can take two of the pills for 40mg.  Please return call at [phone] (home).    Thanks,  SJ

## 2024-09-06 ENCOUNTER — TELEPHONE (OUTPATIENT)
Dept: GASTROENTEROLOGY | Facility: CLINIC | Age: 34
End: 2024-09-06

## 2024-09-06 RX ORDER — PANTOPRAZOLE SODIUM 20 MG/1
20 TABLET, DELAYED RELEASE ORAL
Qty: 180 TABLET | Refills: 1 | Status: SHIPPED | OUTPATIENT
Start: 2024-09-06 | End: 2025-03-05

## 2024-09-06 NOTE — TELEPHONE ENCOUNTER
Called patient back 2x. No answer. Lvm telling him to call the office back or respond back to us on the portal. DMP

## 2024-09-06 NOTE — TELEPHONE ENCOUNTER
brooklynn from Mikaela Dejesus sent at 9/6/2024 3:41 PM CDT -----   Type:  Patient Returning Call     Who Called:Nolan  Who Left Message for Patient:unknown  Does the patient know what this is regarding?:unknown  Would the patient rather a call back or a response via [facility]? call  Best Call Back Number:[phone]  Additional Information: Patient reports missing a call and request to receive another call back. Please give patient a call back to assist.   Thank you,           Staff spoke to pt... he will take it BID and if it doesn't work he will let us know...  viry

## 2024-09-06 NOTE — TELEPHONE ENCOUNTER
Can increase to pantoprazole 20mg BID. Will send out a new prescription. He should take it twice daily for 8 weeks than can try decreasing back down to just 20mg daily. He should keep his OV as scheduled.  Aleksandar

## 2024-09-06 NOTE — TELEPHONE ENCOUNTER
----- Message from Mikaela Dejesus sent at 9/6/2024  3:41 PM CDT -----  Contact: Nolan  Type:  Patient Returning Call    Who Called:Nolan  Who Left Message for Patient:unknown  Does the patient know what this is regarding?:unknown  Would the patient rather a call back or a response via [facility]? call  Best Call Back Number:[phone]  Additional Information: Patient reports missing a call and request to receive another call back. Please give patient a call back to assist.   Thank you,  GH

## 2024-09-06 NOTE — TELEPHONE ENCOUNTER
Message  Received: Today   Pt Advice  Rosa Mahan Staff  Caller: self (Today,  3:07 PM)  Patient is requesting a call back regarding asking can he increase his medication - no one never got back with him on it. Please call back at [phone]

## 2025-03-16 ENCOUNTER — PATIENT MESSAGE (OUTPATIENT)
Dept: GASTROENTEROLOGY | Facility: CLINIC | Age: 35
End: 2025-03-16

## 2025-03-17 NOTE — TELEPHONE ENCOUNTER
Love Mckeon Staff  Caller: DEMETRIUS TOURE [[id number]] (Today,  8:50 AM)  ..TYPE: Patient Requesting Refill    Who Called:  DEMETRIUS TOURE [[id number]]  Refill or New Rx: refill/renew  RX Name and Strength: carafate, not on file. For Gurd.  How is the patient currently taking it? (ex. 1XDay):  Is this a 30 day or 90 day RX: as prescribed  Preferred Pharmacy with phone number:  [id number] - [place], [place] - [address]  [address] [address]  Phone: [phone] Fax: [phone]  Local or Mail Order:local  Ordering Provider: Edward  Would the patient rather a call back or a response via [facility]?  call  Best Call Back Number: [phone] (home)  Additional Information:

## 2025-03-18 ENCOUNTER — TELEPHONE (OUTPATIENT)
Dept: GASTROENTEROLOGY | Facility: CLINIC | Age: 35
End: 2025-03-18

## 2025-03-18 ENCOUNTER — OFFICE VISIT (OUTPATIENT)
Dept: GASTROENTEROLOGY | Facility: CLINIC | Age: 35
End: 2025-03-18

## 2025-03-18 VITALS
SYSTOLIC BLOOD PRESSURE: 133 MMHG | HEART RATE: 88 BPM | HEIGHT: 70 IN | DIASTOLIC BLOOD PRESSURE: 80 MMHG | WEIGHT: 194 LBS | BODY MASS INDEX: 27.77 KG/M2 | OXYGEN SATURATION: 93 %

## 2025-03-18 DIAGNOSIS — K21.9 GASTROESOPHAGEAL REFLUX DISEASE, UNSPECIFIED WHETHER ESOPHAGITIS PRESENT: Primary | ICD-10-CM

## 2025-03-18 DIAGNOSIS — R11.0 NAUSEA: ICD-10-CM

## 2025-03-18 DIAGNOSIS — R10.13 EPIGASTRIC PAIN: ICD-10-CM

## 2025-03-18 PROCEDURE — 99213 OFFICE O/P EST LOW 20 MIN: CPT | Mod: S$PBB,,, | Performed by: INTERNAL MEDICINE

## 2025-03-18 RX ORDER — SUCRALFATE 1 G/1
1 TABLET ORAL 4 TIMES DAILY
Qty: 40 TABLET | Refills: 0 | Status: SHIPPED | OUTPATIENT
Start: 2025-03-18 | End: 2025-03-28

## 2025-03-18 NOTE — PATIENT INSTRUCTIONS
Schedule abdominal ultrasound.    If any tests, procedures, or imaging has been ordered and you are not contacted to schedule within 1-2 weeks, then you may call the central scheduling department directly at [phone].   Please notify my office if you have not been contacted within two weeks after any procedures, submitting any samples (biopsies, blood, stool, urine, etc.) or after any imaging (X-ray, CT, MRI, etc.).

## 2025-03-18 NOTE — TELEPHONE ENCOUNTER
Sent pt a message informing him he would need to come in for a o/v for a refill bc he hasn't been seen since 2023. Informed him April 8th is the earliest he could be seen. Awaiting reply to see if that date works for him along with times (8:40,9:00, & 9:40) -ANGIE

## 2025-03-18 NOTE — PROGRESS NOTES
Clinic Note    Reason for visit:  The primary encounter diagnosis was Gastroesophageal reflux disease, unspecified whether esophagitis present. Diagnoses of Epigastric pain and Nausea were also pertinent to this visit.    PCP: Symone Barraza       HPI:  This is a 34 y.o. male who was last seen in 2023. Patient with GERD and IBS. He has had worsening reflux lately. He states he has episodes of severe abdominal pain that occurs intermittently.  Describes as tight squeezing pain to his abdomen. Pain is 9 (0-10). The severe pain will usually last about 3 hours. He states TUMS, Pepcid, and Carafate all help this pain. He has associated nausea with this pain. This pain usually occurs at night time. He does think that spicy or caffeine may be exacerbating these symptoms. He is having mild abdominal discomfort after each meal currently. He takes pantoprazole 20mg daily. Will take BID occasionally. Denies abdominal pain. Reports having BM daily. Is having loose stools intermittently. Can occur multiple times a week. Has been eating mainly vegetables incase his symptoms are related to reflux.. Reports his stool his always yellow. Feels dietary indiscretions are they primary  of his symptoms.    ABD US ordered was not completed.    7/26/2023 C.diff/ Giar/Cryp- negative.      EGD 5/11/2023: Small HH, EGBx reflux.     Review of Systems   Constitutional:  Negative for diaphoresis, fatigue, fever and unexpected weight change.   HENT:  Negative for hearing loss, mouth sores, postnasal drip, sore throat and trouble swallowing.    Eyes:  Negative for pain, discharge and eye dryness.   Respiratory:  Negative for apnea, cough, choking, chest tightness, shortness of breath and wheezing.    Cardiovascular:  Negative for chest pain, palpitations and leg swelling.   Gastrointestinal:  Positive for abdominal pain. Negative for abdominal distention, anal bleeding, blood in stool, change in bowel habit, constipation, diarrhea,  nausea, rectal pain, vomiting, reflux and fecal incontinence.   Genitourinary:  Negative for bladder incontinence, dysuria and hematuria.   Musculoskeletal:  Negative for arthralgias, back pain and joint swelling.   Integumentary:  Negative for color change and rash.   Allergic/Immunologic: Negative for environmental allergies and food allergies.   Neurological:  Negative for seizures and headaches.   Hematological:  Negative for adenopathy. Does not bruise/bleed easily.        Past Medical History:   Diagnosis Date    GERD (gastroesophageal reflux disease)     Other seasonal allergic rhinitis      Past Surgical History:   Procedure Laterality Date    CYST REMOVAL      vocal cord    ENDOSCOPY      WISDOM TOOTH EXTRACTION       Family History   Problem Relation Name Age of Onset    No Known Problems Mother      No Known Problems Father      No Known Problems Sister      No Known Problems Brother      No Known Problems Maternal Aunt      No Known Problems Maternal Uncle      No Known Problems Paternal Aunt      No Known Problems Paternal Uncle      No Known Problems Maternal Grandmother      No Known Problems Maternal Grandfather      No Known Problems Paternal Grandmother      No Known Problems Paternal Grandfather      Colon cancer Neg Hx      Crohn's disease Neg Hx      Esophageal cancer Neg Hx      Hemochromatosis Neg Hx      Liver cancer Neg Hx      Liver disease Neg Hx      Rectal cancer Neg Hx      Stomach cancer Neg Hx       Social History[1]  Review of patient's allergies indicates:  No Known Allergies     Medication List with Changes/Refills   New Medications    SUCRALFATE (CARAFATE) 1 GRAM TABLET    Take 1 tablet (1 g total) by mouth 4 (four) times daily. for 10 days   Current Medications    PANTOPRAZOLE (PROTONIX) 20 MG TABLET    Take 1 tablet (20 mg total) by mouth 2 (two) times daily before meals.   Discontinued Medications    DICYCLOMINE (BENTYL) 10 MG CAPSULE    Take 1 capsule (10 mg total) by mouth  "before meals and at bedtime as needed (loose stools or abdominal cramping).         Vital Signs:  /80 (BP Location: Left arm, Patient Position: Sitting)   Pulse 88   Ht 5' 10" (1.778 m)   Wt 88 kg (194 lb)   SpO2 (!) 93%   BMI 27.84 kg/m²         Physical Exam  Constitutional:       General: He is not in acute distress.     Appearance: Normal appearance. He is well-developed. He is not ill-appearing or toxic-appearing.   HENT:      Head: Normocephalic and atraumatic.      Nose: Nose normal.      Mouth/Throat:      Mouth: Mucous membranes are moist.      Pharynx: Oropharynx is clear. No oropharyngeal exudate or posterior oropharyngeal erythema.   Eyes:      General: Lids are normal. No scleral icterus.        Right eye: No discharge.         Left eye: No discharge.      Conjunctiva/sclera: Conjunctivae normal.   Cardiovascular:      Rate and Rhythm: Normal rate and regular rhythm.      Pulses:           Radial pulses are 2+ on the right side and 2+ on the left side.   Pulmonary:      Effort: Pulmonary effort is normal. No respiratory distress.      Breath sounds: No stridor. No wheezing.   Abdominal:      General: Bowel sounds are normal. There is no distension.      Palpations: Abdomen is soft. There is no fluid wave, hepatomegaly, splenomegaly or mass.      Tenderness: There is no abdominal tenderness. There is no guarding or rebound.   Lymphadenopathy:      Cervical: No cervical adenopathy.   Skin:     General: Skin is warm and dry.      Capillary Refill: Capillary refill takes less than 2 seconds.      Coloration: Skin is not cyanotic, jaundiced or pale.   Neurological:      General: No focal deficit present.      Mental Status: He is alert and oriented to person, place, and time.   Psychiatric:         Mood and Affect: Mood normal.         Behavior: Behavior is cooperative.            All of the data above and below has been reviewed by myself and any further interpretations will be reflected in the " assessment and plan.   The data includes review of external notes, and independent interpretation of lab results, procedures, x-rays, and imaging reports.      Assessment:  Gastroesophageal reflux disease, unspecified whether esophagitis present  -     sucralfate (CARAFATE) 1 gram tablet; Take 1 tablet (1 g total) by mouth 4 (four) times daily. for 10 days  Dispense: 40 tablet; Refill: 0    Epigastric pain  -     US Abdomen Limited; Future; Expected date: 03/18/2025    Nausea  -     US Abdomen Limited; Future; Expected date: 03/18/2025         GERD- on pantoprazole 20mg. Abdominal pain improves with carafate. Will send out a course of carfate. Does not feel primary source is anxiety given the direct relation to diet.  Plan for US to r/o GB etiology.    ECG done when he had tooth procedure. Told it was normal. He was around 25-27 years of age.    Discuss the potential of low-dose amitriptyline for IBS.  We will get the abdominal ultrasound first.  We may need to request his electrocardiogram report and image from his prior ENT Dr. Acosta from 2018.    Recommendations:    Schedule abdominal ultrasound.  Okay to take carafate.    If any tests, procedures, or imaging has been ordered and you are not contacted to schedule within 1-2 weeks, then you may call the central scheduling department directly at [phone].     Risks, benefits, and alternatives of medical management, any associated procedures, and/or treatment discussed with the patient. Patient given opportunity to ask questions and voices understanding. Patient has elected to proceed with the recommended care modalities as discussed.    Instructed patient to notify my office if they have not been contacted within two weeks after any procedures, submitting any samples (biopsies, blood, stool, urine, etc.) or after any imaging (X-ray, CT, MRI, etc.).     Follow up in about 1 year (around 3/18/2026).    Order summary:  Orders Placed This Encounter    Procedures    US Abdomen Limited      This assessment, plan, and documentation was performed in collaboration with Judy Meza NP.     This document may have been created using a voice recognition transcribing system. Incorrect words or phrases may have been missed during proofreading. Please interpret accordingly or contact me for clarification.     Melania Leon MD         [1]   Social History  Tobacco Use    Smoking status: Never    Smokeless tobacco: Never   Substance Use Topics    Alcohol use: Never    Drug use: Never

## 2025-03-31 DIAGNOSIS — R10.13 EPIGASTRIC PAIN: Primary | ICD-10-CM

## 2025-03-31 RX ORDER — SUCRALFATE 1 G/1
1 TABLET ORAL 4 TIMES DAILY
Qty: 40 TABLET | Refills: 0 | Status: CANCELLED | OUTPATIENT
Start: 2025-03-31 | End: 2025-04-10

## 2025-03-31 NOTE — TELEPHONE ENCOUNTER
Patient wanted to check where the carafate rx request was and also get the US results, states he saw in his [facility] portal that the radiologist submitted impressions, I do not have notations from our providers on results so I informed him that as soon as the providers reviewed the impression the results would be notated and we'd call him to let him know the final results. Patient has no further questions at this time.

## 2025-03-31 NOTE — TELEPHONE ENCOUNTER
----- Message from Jil sent at 3/31/2025  3:50 PM CDT -----  Contact: pt  Pt calling for results of his ultrasound test and can be reached at [phone]  Pt also wants refill for carafart medication.[id number] [place], [place] - [address] [address]Madbi: [phone] Fax: [phone],

## 2025-03-31 NOTE — TELEPHONE ENCOUNTER
----- Message from Ibeth sent at 3/31/2025  9:09 AM CDT -----  Contact: self  Type:  Needs Medical AdviceWho Called: [place] Earl name and phone #:  [id number] [place], [place] [facility] [id number] [place] [address]Mzmrm: [phone] Fax: [phone] the patient rather a call back or a response via [facility]? Call [facility] Call Back Number: [phone] Information: pt stating he was prescribed a 10 day supply of carafate. States he was informed to call to get a refill if he was still having symptoms and he is stating he is.

## 2025-04-02 ENCOUNTER — TELEPHONE (OUTPATIENT)
Dept: GASTROENTEROLOGY | Facility: CLINIC | Age: 35
End: 2025-04-02

## 2025-04-02 NOTE — TELEPHONE ENCOUNTER
Patient calling to check on US results I informed patient dr georges has not yet seen them as of yet.- ABO

## 2025-04-02 NOTE — TELEPHONE ENCOUNTER
----- Message from Annelise sent at 4/2/2025  2:14 PM CDT -----  Patient is calling in regards to results and medication refill that's not listed..please call him back at [phone]

## 2025-04-03 RX ORDER — SUCRALFATE 1 G/1
1 TABLET ORAL 4 TIMES DAILY
Qty: 40 TABLET | Refills: 0 | Status: SHIPPED | OUTPATIENT
Start: 2025-04-03 | End: 2025-04-13

## 2025-04-03 NOTE — TELEPHONE ENCOUNTER
Notify patient his US showed gallstones and overgrowth of the gallbladder wall. It's possible that his upper GI symptoms are due to the gallbladder findings and typically we would recommend referral to a general surgeon to discuss removing the gallbladder. I know he does not have insurance but we can still send the referral to a general surgeon if he would like. If his pain resolves with Carafate, then he can continue taking it for now. He should let us know if he wants referral to gen surg and if he has a preference on a surgeon.    RUQ US 3/31/2025: fatty liver, cholelithiasis, GB adenomyomatosis.   MLC

## 2025-04-10 ENCOUNTER — TELEPHONE (OUTPATIENT)
Dept: GASTROENTEROLOGY | Facility: CLINIC | Age: 35
End: 2025-04-10

## 2025-04-10 NOTE — TELEPHONE ENCOUNTER
----- Message from Annelise sent at 4/10/2025  3:56 PM CDT -----  Patient is calling to setup consultation please call him back at [phone]

## 2025-04-10 NOTE — TELEPHONE ENCOUNTER
PATIENT called saying he does not know what his next steps are for the gallstones and I informed him we sent a referral to dr cuevas and he said they called him and was trying to schedule his surgery right then and there and he said he would like to have a consult first and wanted to know what you recommend him to do. -ABO

## 2025-04-14 DIAGNOSIS — R10.13 EPIGASTRIC PAIN: ICD-10-CM

## 2025-04-14 RX ORDER — SUCRALFATE 1 G/1
1 TABLET ORAL 4 TIMES DAILY
Qty: 120 TABLET | Refills: 0 | Status: SHIPPED | OUTPATIENT
Start: 2025-04-14 | End: 2025-05-14

## 2025-04-14 NOTE — TELEPHONE ENCOUNTER
Please see the attached refill request. Patient sent follow up message asking for more than a 10 day script.

## 2025-05-08 DIAGNOSIS — R10.13 EPIGASTRIC PAIN: ICD-10-CM

## 2025-05-08 RX ORDER — SUCRALFATE 1 G/1
1 TABLET ORAL 4 TIMES DAILY
Qty: 360 TABLET | Refills: 1 | Status: SHIPPED | OUTPATIENT
Start: 2025-05-08

## 2025-05-27 ENCOUNTER — TELEPHONE (OUTPATIENT)
Dept: GASTROENTEROLOGY | Facility: CLINIC | Age: 35
End: 2025-05-27

## 2025-05-27 NOTE — TELEPHONE ENCOUNTER
Spoke to patient, he was unable to  the refill sent on 5/8 and thought that it would need a new rx sent, let patient know that the rx is still active and he just needs to call [facility] to let them know he's ready to  and have them refill the rx if it's been re-shelved. Asked patient to call us back if he has any trouble.

## 2025-05-27 NOTE — TELEPHONE ENCOUNTER
Copied from CRM #[id number]. Topic: General Inquiry - Return Call  >> May 27, 2025 10:22 AM Zuhair wrote:  Type:  Patient Requesting Call    Who Called:Nolan Paris  Does the patient know what this is regarding?:pt is calling to speak with nurse regarding medication refill he requested due to being out of town, he could not get the medication  Would the patient rather a call back or a response via [facility]? call  Best Call Back Number:.[phone]  Additional Information:

## 2025-07-30 DIAGNOSIS — K21.9 GASTROESOPHAGEAL REFLUX DISEASE, UNSPECIFIED WHETHER ESOPHAGITIS PRESENT: ICD-10-CM

## 2025-07-30 RX ORDER — PANTOPRAZOLE SODIUM 20 MG/1
20 TABLET, DELAYED RELEASE ORAL
Qty: 180 TABLET | Refills: 1 | Status: SHIPPED | OUTPATIENT
Start: 2025-07-30 | End: 2026-01-26